# Patient Record
Sex: MALE | Race: WHITE
[De-identification: names, ages, dates, MRNs, and addresses within clinical notes are randomized per-mention and may not be internally consistent; named-entity substitution may affect disease eponyms.]

---

## 2018-02-06 ENCOUNTER — HOSPITAL ENCOUNTER (EMERGENCY)
Dept: HOSPITAL 17 - NEPD | Age: 43
Discharge: HOME | End: 2018-02-06
Payer: SELF-PAY

## 2018-02-06 VITALS
DIASTOLIC BLOOD PRESSURE: 110 MMHG | HEART RATE: 90 BPM | RESPIRATION RATE: 16 BRPM | OXYGEN SATURATION: 98 % | SYSTOLIC BLOOD PRESSURE: 193 MMHG

## 2018-02-06 VITALS
SYSTOLIC BLOOD PRESSURE: 167 MMHG | RESPIRATION RATE: 16 BRPM | DIASTOLIC BLOOD PRESSURE: 93 MMHG | OXYGEN SATURATION: 98 % | HEART RATE: 92 BPM

## 2018-02-06 VITALS
OXYGEN SATURATION: 98 % | SYSTOLIC BLOOD PRESSURE: 174 MMHG | DIASTOLIC BLOOD PRESSURE: 96 MMHG | HEART RATE: 90 BPM | RESPIRATION RATE: 16 BRPM

## 2018-02-06 VITALS — BODY MASS INDEX: 27.15 KG/M2 | HEIGHT: 72 IN | WEIGHT: 200.42 LBS

## 2018-02-06 VITALS
DIASTOLIC BLOOD PRESSURE: 121 MMHG | OXYGEN SATURATION: 99 % | SYSTOLIC BLOOD PRESSURE: 226 MMHG | HEART RATE: 100 BPM | RESPIRATION RATE: 18 BRPM | TEMPERATURE: 97.7 F

## 2018-02-06 VITALS
RESPIRATION RATE: 16 BRPM | SYSTOLIC BLOOD PRESSURE: 167 MMHG | OXYGEN SATURATION: 97 % | DIASTOLIC BLOOD PRESSURE: 84 MMHG | HEART RATE: 90 BPM

## 2018-02-06 DIAGNOSIS — I83.811: Primary | ICD-10-CM

## 2018-02-06 DIAGNOSIS — F17.200: ICD-10-CM

## 2018-02-06 LAB
BASOPHILS # BLD AUTO: 0.1 TH/MM3 (ref 0–0.2)
BASOPHILS NFR BLD: 0.8 % (ref 0–2)
BUN SERPL-MCNC: 12 MG/DL (ref 7–18)
CALCIUM SERPL-MCNC: 8.7 MG/DL (ref 8.5–10.1)
CHLORIDE SERPL-SCNC: 103 MEQ/L (ref 98–107)
CREAT SERPL-MCNC: 0.99 MG/DL (ref 0.6–1.3)
EOSINOPHIL # BLD: 0.2 TH/MM3 (ref 0–0.4)
EOSINOPHIL NFR BLD: 2 % (ref 0–4)
ERYTHROCYTE [DISTWIDTH] IN BLOOD BY AUTOMATED COUNT: 13.4 % (ref 11.6–17.2)
GFR SERPLBLD BASED ON 1.73 SQ M-ARVRAT: 83 ML/MIN (ref 89–?)
GLUCOSE SERPL-MCNC: 106 MG/DL (ref 74–106)
HCO3 BLD-SCNC: 27.4 MEQ/L (ref 21–32)
HCT VFR BLD CALC: 48.5 % (ref 39–51)
HGB BLD-MCNC: 16.9 GM/DL (ref 13–17)
LYMPHOCYTES # BLD AUTO: 3.2 TH/MM3 (ref 1–4.8)
LYMPHOCYTES NFR BLD AUTO: 31.5 % (ref 9–44)
MCH RBC QN AUTO: 32.4 PG (ref 27–34)
MCHC RBC AUTO-ENTMCNC: 34.9 % (ref 32–36)
MCV RBC AUTO: 92.9 FL (ref 80–100)
MONOCYTE #: 0.6 TH/MM3 (ref 0–0.9)
MONOCYTES NFR BLD: 6.2 % (ref 0–8)
NEUTROPHILS # BLD AUTO: 6 TH/MM3 (ref 1.8–7.7)
NEUTROPHILS NFR BLD AUTO: 59.5 % (ref 16–70)
PLATELET # BLD: 238 TH/MM3 (ref 150–450)
PMV BLD AUTO: 7.5 FL (ref 7–11)
RBC # BLD AUTO: 5.22 MIL/MM3 (ref 4.5–5.9)
SODIUM SERPL-SCNC: 140 MEQ/L (ref 136–145)
WBC # BLD AUTO: 10.1 TH/MM3 (ref 4–11)

## 2018-02-06 PROCEDURE — 96374 THER/PROPH/DIAG INJ IV PUSH: CPT

## 2018-02-06 PROCEDURE — 93971 EXTREMITY STUDY: CPT

## 2018-02-06 PROCEDURE — 85025 COMPLETE CBC W/AUTO DIFF WBC: CPT

## 2018-02-06 PROCEDURE — 80048 BASIC METABOLIC PNL TOTAL CA: CPT

## 2018-02-06 PROCEDURE — 99284 EMERGENCY DEPT VISIT MOD MDM: CPT

## 2018-02-06 NOTE — RADRPT
EXAM DATE/TIME:  02/06/2018 20:57 

 

[facility] COMPARISON:     

No previous studies available for comparison.

        

 

 

INDICATIONS :                

Right leg pain.

            

 

MEDICAL HISTORY :           

Right leg pain.

 

SURGICAL HISTORY :         

Lymph node surgery.

 

ENCOUNTER:     

Initial

 

ACUITY:     

1 day

 

PAIN SCORE:      

3/10

 

LOCATION:      

Right  leg.

            

                      

 

TECHNIQUE:     

Venous ultrasound of the leg was performed from the inguinal ligament to the proximal calf.  Real-escobar
e, color Doppler and spectral tracing, compression and augmentation techniques were used.  

 

FINDINGS:     

There is normal compressibility of the deep venous system from the inguinal region to the proximal ca
lf.  No echogenic clot is seen in the lumen of the common femoral, femoral, popliteal, and posterior 
tibial veins.  There is a normal response of the venous system to proximal and distal augmentation an
d respiration.  

 

There are some thrombosed varicose veins seen along the medial lower right leg in the area of pain an
d swelling.

 

CONCLUSION:     

1. No evidence of DVT.

2. There are some thrombosed varicose veins along the medial right lower leg in the area pain and swe
lling.

 

 

 

 Luisito Jimenez MD on February 06, 2018 at 22:03           

Board Certified Radiologist.

 This report was verified electronically.

## 2018-02-06 NOTE — PD
HPI


Chief Complaint:  Pain: Acute or Chronic


Time Seen by Provider:  21:05


Travel History


International Travel<30 days:  No


Contact w/Intl Traveler<30days:  No


Traveled to known affect area:  No





History of Present Illness


HPI


41yo M with no significant PMH because he does not see a physician is here 

because his family made him.  Said he has this red swellen area in right leg 

for at least 1 week but less than 2 weeks.  Said it was hurting a little but 

not too much.  Denies any fever, chest pain, sob, n/v, abdominal pain, focal 

weakness or numbness, or trauma.  Pt said he has these bumps in his right leg 

for years and does not bother him.





PFSH


Past Medical History


Diminished Hearing:  No





Past Surgical History


Endocrine Surgery:  Yes (cysts in lymph nodes removed)





Social History


Alcohol Use:  Yes


Tobacco Use:  Yes (1 PPD)


Substance Use:  No ('WEED')





Allergies-Medications


(Allergen,Severity, Reaction):  


Coded Allergies:  


     No Known Allergies (Verified  Adverse Reaction, Unknown, 2/6/18)


Reported Meds & Prescriptions





Reported Meds & Active Scripts


Active


Amlodipine (Amlodipine Besylate) 5 Mg Tab 5 Mg PO DAILY








Review of Systems


Except as stated in HPI:  all other systems reviewed are Neg





Physical Exam


Narrative


GENERAL: 41yo M not in distress.


SKIN: Focused skin assessment warm/dry.


HEAD: Atraumatic. Normocephalic. 


EYES: Pupils equal and round. No scleral icterus. No injection or drainage. 


ENT: No nasal bleeding or discharge.  Mucous membranes pink and moist.


NECK: Trachea midline. No JVD. 


CARDIOVASCULAR: Regular rate and rhythm.  No murmur appreciated.


RESPIRATORY: No accessory muscle use. Clear to auscultation. Breath sounds 

equal bilaterally. 


GASTROINTESTINAL: Abdomen soft, non-tender, nondistended. 


MUSCULOSKELETAL: RLE: +Large variose veins.  +Erythema 6cm by 6cm medial tibia.

  No fluctuance.  DP 2+.  Sensation intact.  


NEUROLOGICAL: Awake and alert. No obvious cranial nerve deficits.  Motor 

grossly within normal limits. Normal speech.


PSYCHIATRIC: Appropriate mood and affect; insight and judgment normal.





Data


Data


Last Documented VS





Vital Signs








  Date Time  Temp Pulse Resp B/P (MAP) Pulse Ox O2 Delivery O2 Flow Rate FiO2


 


2/6/18 23:18        


 


2/6/18 22:46  90 16  97 Room Air  


 


2/6/18 18:47 97.7       








Orders





 Orders


Us Leg Venous Doppler (2/6/18 )


Complete Blood Count With Diff (2/6/18 21:11)


Basic Metabolic Panel (Bmp) (2/6/18 21:11)


Hydralazine Inj (Apresoline Inj) (2/6/18 21:15)


Ed Discharge Order (2/6/18 23:23)





Labs





Laboratory Tests








Test


  2/6/18


21:55


 


White Blood Count 10.1 TH/MM3 


 


Red Blood Count 5.22 MIL/MM3 


 


Hemoglobin 16.9 GM/DL 


 


Hematocrit 48.5 % 


 


Mean Corpuscular Volume 92.9 FL 


 


Mean Corpuscular Hemoglobin 32.4 PG 


 


Mean Corpuscular Hemoglobin


Concent 34.9 % 


 


 


Red Cell Distribution Width 13.4 % 


 


Platelet Count 238 TH/MM3 


 


Mean Platelet Volume 7.5 FL 


 


Neutrophils (%) (Auto) 59.5 % 


 


Lymphocytes (%) (Auto) 31.5 % 


 


Monocytes (%) (Auto) 6.2 % 


 


Eosinophils (%) (Auto) 2.0 % 


 


Basophils (%) (Auto) 0.8 % 


 


Neutrophils # (Auto) 6.0 TH/MM3 


 


Lymphocytes # (Auto) 3.2 TH/MM3 


 


Monocytes # (Auto) 0.6 TH/MM3 


 


Eosinophils # (Auto) 0.2 TH/MM3 


 


Basophils # (Auto) 0.1 TH/MM3 


 


CBC Comment DIFF FINAL 


 


Differential Comment  


 


Blood Urea Nitrogen 12 MG/DL 


 


Creatinine 0.99 MG/DL 


 


Random Glucose 106 MG/DL 


 


Calcium Level 8.7 MG/DL 


 


Sodium Level 140 MEQ/L 


 


Potassium Level 3.6 MEQ/L 


 


Chloride Level 103 MEQ/L 


 


Carbon Dioxide Level 27.4 MEQ/L 


 


Anion Gap 10 MEQ/L 


 


Estimat Glomerular Filtration


Rate 83 ML/MIN 


 











[facility]


Medical Decision Making


Medical Screen Exam Complete:  Yes


Emergency Medical Condition:  Yes


Differential Diagnosis


Abscess vs. thrombosed varicose veins


Narrative Course


41yo M with bumps in his right leg for a long time.  His family made him come 

in.  There is also an area of erythema that is not very tender to palpation.  

BP was markedly elevated at 226/21.  Pt given hydralazine 10mg IV.  Labs 

reviewed, no leukocytosis.  H/H normal.  Creatinine normal.  US right leg 

showed no DVT.  There are some thrombosed varicose veins along medial right 

lower leg in area of pain and swelling.  Pt said he does not want anything for 

pain now. Denies any complaints.  BP is now 164/84.  Will start pt on blood 

pressure medication and have him follow up with PMD.  Will have him follow up 

with vascular for thrombosed varicose vein.  Return precautions given.





Diagnosis





 Primary Impression:  


 Varicose vein of leg


Referrals:  


Apollo Michaels MD


call for appointment


Thrombosed varicose vein


Patient Instructions:  General Instructions


Departure Forms:  Tests/Procedures





***Additional Instructions:  


Please follow up with vascular surgery about thrombosed varicose veins.  Please 

follow up with [facility] for elevated blood pressure.  Return to the 

ED if symptoms worsen.


***Med/Other Pt SpecificInfo:  Prescription(s) given


Scripts


Amlodipine (Amlodipine) 5 Mg Tab


5 MG PO DAILY for Blood Pressure Management, #30 TAB 0 Refills


   Prov: GuoCaroline bautista          2/6/18


Disposition:  01 DISCHARGE HOME


Condition:  Stable











Caroline Guo DO Feb 6, 2018 21:13